# Patient Record
Sex: MALE | Race: WHITE | NOT HISPANIC OR LATINO
[De-identification: names, ages, dates, MRNs, and addresses within clinical notes are randomized per-mention and may not be internally consistent; named-entity substitution may affect disease eponyms.]

---

## 2017-08-02 ENCOUNTER — TRANSCRIPTION ENCOUNTER (OUTPATIENT)
Age: 32
End: 2017-08-02

## 2021-01-22 ENCOUNTER — APPOINTMENT (OUTPATIENT)
Dept: CT IMAGING | Facility: CLINIC | Age: 36
End: 2021-01-22
Payer: COMMERCIAL

## 2021-01-22 ENCOUNTER — APPOINTMENT (OUTPATIENT)
Dept: RADIOLOGY | Facility: CLINIC | Age: 36
End: 2021-01-22
Payer: COMMERCIAL

## 2021-01-22 PROBLEM — Z00.00 ENCOUNTER FOR PREVENTIVE HEALTH EXAMINATION: Status: ACTIVE | Noted: 2021-01-22

## 2021-01-22 PROCEDURE — Q9967B: CUSTOM

## 2021-01-22 PROCEDURE — 73030 X-RAY EXAM OF SHOULDER: CPT | Mod: LT

## 2021-01-22 PROCEDURE — 74177 CT ABD & PELVIS W/CONTRAST: CPT

## 2021-11-17 ENCOUNTER — TRANSCRIPTION ENCOUNTER (OUTPATIENT)
Age: 36
End: 2021-11-17

## 2021-11-22 ENCOUNTER — APPOINTMENT (OUTPATIENT)
Dept: CARDIOLOGY | Facility: CLINIC | Age: 36
End: 2021-11-22
Payer: COMMERCIAL

## 2021-11-22 VITALS
HEART RATE: 72 BPM | OXYGEN SATURATION: 97 % | HEIGHT: 71 IN | TEMPERATURE: 97.5 F | BODY MASS INDEX: 24.08 KG/M2 | DIASTOLIC BLOOD PRESSURE: 92 MMHG | WEIGHT: 172 LBS | SYSTOLIC BLOOD PRESSURE: 162 MMHG

## 2021-11-22 VITALS — SYSTOLIC BLOOD PRESSURE: 152 MMHG | DIASTOLIC BLOOD PRESSURE: 92 MMHG

## 2021-11-22 DIAGNOSIS — I49.9 CARDIAC ARRHYTHMIA, UNSPECIFIED: ICD-10-CM

## 2021-11-22 DIAGNOSIS — I49.3 VENTRICULAR PREMATURE DEPOLARIZATION: ICD-10-CM

## 2021-11-22 DIAGNOSIS — R12 HEARTBURN: ICD-10-CM

## 2021-11-22 DIAGNOSIS — Z78.9 OTHER SPECIFIED HEALTH STATUS: ICD-10-CM

## 2021-11-22 DIAGNOSIS — F12.90 CANNABIS USE, UNSPECIFIED, UNCOMPLICATED: ICD-10-CM

## 2021-11-22 DIAGNOSIS — R20.0 ANESTHESIA OF SKIN: ICD-10-CM

## 2021-11-22 DIAGNOSIS — Z86.79 PERSONAL HISTORY OF OTHER DISEASES OF THE CIRCULATORY SYSTEM: ICD-10-CM

## 2021-11-22 DIAGNOSIS — Z87.891 PERSONAL HISTORY OF NICOTINE DEPENDENCE: ICD-10-CM

## 2021-11-22 PROCEDURE — 93246 EXT ECG>7D<15D RECORDING: CPT

## 2021-11-22 PROCEDURE — 99204 OFFICE O/P NEW MOD 45 MIN: CPT

## 2021-12-13 PROCEDURE — 93248 EXT ECG>7D<15D REV&INTERPJ: CPT

## 2021-12-22 ENCOUNTER — APPOINTMENT (OUTPATIENT)
Dept: CARDIOLOGY | Facility: CLINIC | Age: 36
End: 2021-12-22
Payer: COMMERCIAL

## 2021-12-22 ENCOUNTER — NON-APPOINTMENT (OUTPATIENT)
Age: 36
End: 2021-12-22

## 2021-12-22 DIAGNOSIS — R07.9 CHEST PAIN, UNSPECIFIED: ICD-10-CM

## 2021-12-22 DIAGNOSIS — R00.2 PALPITATIONS: ICD-10-CM

## 2021-12-22 PROCEDURE — 93015 CV STRESS TEST SUPVJ I&R: CPT

## 2021-12-22 PROCEDURE — 93306 TTE W/DOPPLER COMPLETE: CPT

## 2021-12-27 ENCOUNTER — APPOINTMENT (OUTPATIENT)
Dept: CARDIOLOGY | Facility: CLINIC | Age: 36
End: 2021-12-27

## 2021-12-27 ENCOUNTER — NON-APPOINTMENT (OUTPATIENT)
Age: 36
End: 2021-12-27

## 2022-09-20 ENCOUNTER — NON-APPOINTMENT (OUTPATIENT)
Age: 37
End: 2022-09-20

## 2023-03-06 ENCOUNTER — APPOINTMENT (OUTPATIENT)
Dept: RADIOLOGY | Facility: CLINIC | Age: 38
End: 2023-03-06
Payer: COMMERCIAL

## 2023-03-06 PROCEDURE — 72072 X-RAY EXAM THORAC SPINE 3VWS: CPT

## 2024-01-29 ENCOUNTER — APPOINTMENT (OUTPATIENT)
Dept: DERMATOLOGY | Facility: CLINIC | Age: 39
End: 2024-01-29
Payer: COMMERCIAL

## 2024-01-29 ENCOUNTER — NON-APPOINTMENT (OUTPATIENT)
Age: 39
End: 2024-01-29

## 2024-01-29 DIAGNOSIS — L30.4 ERYTHEMA INTERTRIGO: ICD-10-CM

## 2024-01-29 DIAGNOSIS — K60.2 ANAL FISSURE, UNSPECIFIED: ICD-10-CM

## 2024-01-29 PROCEDURE — 99203 OFFICE O/P NEW LOW 30 MIN: CPT

## 2024-01-29 RX ORDER — VALACYCLOVIR 500 MG/1
TABLET, FILM COATED ORAL
Refills: 0 | Status: ACTIVE | COMMUNITY

## 2024-01-29 RX ORDER — KETOCONAZOLE 20 MG/G
2 CREAM TOPICAL
Qty: 1 | Refills: 1 | Status: ACTIVE | COMMUNITY
Start: 2024-01-29 | End: 1900-01-01

## 2024-01-29 NOTE — ASSESSMENT
[FreeTextEntry1] : Intertrigo: Ketoconazole cream BID until resolved  Fissure: Triple butt paste with zinc oxide BID over ketoconazole cream  F/U if rash recurs or does not completely resolve  Recommend gentle cleansers and moisturizers. Avoid fragrances or scented personal care products.

## 2024-01-29 NOTE — HISTORY OF PRESENT ILLNESS
[FreeTextEntry1] : Itchy rash on buttocks [de-identified] : Patient here for rash on buttocks x 1 year. Started after patient had sciatic issues. Patient has tried Lotrimin cream and other OTC anti-fungal topicals x 1 month. Patient has noticed improvement of itching and redness.   Patient denies any hx of eczema or allergies.

## 2024-02-12 ENCOUNTER — APPOINTMENT (OUTPATIENT)
Dept: ULTRASOUND IMAGING | Facility: CLINIC | Age: 39
End: 2024-02-12
Payer: COMMERCIAL

## 2024-02-12 PROCEDURE — 93971 EXTREMITY STUDY: CPT | Mod: LT

## 2024-05-30 ENCOUNTER — NON-APPOINTMENT (OUTPATIENT)
Age: 39
End: 2024-05-30

## 2024-08-08 ENCOUNTER — NON-APPOINTMENT (OUTPATIENT)
Age: 39
End: 2024-08-08

## 2025-06-03 ENCOUNTER — NON-APPOINTMENT (OUTPATIENT)
Age: 40
End: 2025-06-03

## 2025-06-04 ENCOUNTER — NON-APPOINTMENT (OUTPATIENT)
Age: 40
End: 2025-06-04

## 2025-06-09 ENCOUNTER — APPOINTMENT (OUTPATIENT)
Dept: CARDIOLOGY | Facility: CLINIC | Age: 40
End: 2025-06-09
Payer: COMMERCIAL

## 2025-06-09 ENCOUNTER — NON-APPOINTMENT (OUTPATIENT)
Age: 40
End: 2025-06-09

## 2025-06-09 VITALS
HEIGHT: 70 IN | WEIGHT: 186 LBS | DIASTOLIC BLOOD PRESSURE: 82 MMHG | SYSTOLIC BLOOD PRESSURE: 118 MMHG | HEART RATE: 93 BPM | BODY MASS INDEX: 26.63 KG/M2 | OXYGEN SATURATION: 97 %

## 2025-06-09 VITALS — DIASTOLIC BLOOD PRESSURE: 80 MMHG | SYSTOLIC BLOOD PRESSURE: 128 MMHG

## 2025-06-09 PROBLEM — F12.90 USES MARIJUANA: Status: RESOLVED | Noted: 2021-11-22 | Resolved: 2025-06-09

## 2025-06-09 PROBLEM — E78.5 HYPERLIPEMIA: Status: ACTIVE | Noted: 2025-06-09

## 2025-06-09 PROCEDURE — 93000 ELECTROCARDIOGRAM COMPLETE: CPT

## 2025-06-09 PROCEDURE — 99204 OFFICE O/P NEW MOD 45 MIN: CPT

## 2025-06-30 ENCOUNTER — APPOINTMENT (OUTPATIENT)
Dept: CARDIOLOGY | Facility: CLINIC | Age: 40
End: 2025-06-30
Payer: COMMERCIAL

## 2025-06-30 PROCEDURE — 93306 TTE W/DOPPLER COMPLETE: CPT

## 2025-08-29 ENCOUNTER — APPOINTMENT (OUTPATIENT)
Dept: CARDIOLOGY | Facility: CLINIC | Age: 40
End: 2025-08-29

## 2025-08-29 DIAGNOSIS — I49.9 CARDIAC ARRHYTHMIA, UNSPECIFIED: ICD-10-CM

## 2025-08-29 DIAGNOSIS — R07.9 CHEST PAIN, UNSPECIFIED: ICD-10-CM

## 2025-08-29 DIAGNOSIS — R00.2 PALPITATIONS: ICD-10-CM

## 2025-08-29 DIAGNOSIS — I49.3 VENTRICULAR PREMATURE DEPOLARIZATION: ICD-10-CM

## 2025-08-29 PROCEDURE — 93015 CV STRESS TEST SUPVJ I&R: CPT

## 2025-09-11 ENCOUNTER — APPOINTMENT (OUTPATIENT)
Dept: CARDIOLOGY | Facility: CLINIC | Age: 40
End: 2025-09-11
Payer: COMMERCIAL

## 2025-09-11 VITALS
OXYGEN SATURATION: 98 % | WEIGHT: 191 LBS | HEART RATE: 84 BPM | DIASTOLIC BLOOD PRESSURE: 80 MMHG | BODY MASS INDEX: 27.41 KG/M2 | SYSTOLIC BLOOD PRESSURE: 118 MMHG

## 2025-09-11 DIAGNOSIS — E78.5 HYPERLIPIDEMIA, UNSPECIFIED: ICD-10-CM

## 2025-09-11 DIAGNOSIS — R00.2 PALPITATIONS: ICD-10-CM

## 2025-09-11 DIAGNOSIS — R07.9 CHEST PAIN, UNSPECIFIED: ICD-10-CM

## 2025-09-11 PROCEDURE — 93000 ELECTROCARDIOGRAM COMPLETE: CPT

## 2025-09-11 PROCEDURE — 99214 OFFICE O/P EST MOD 30 MIN: CPT
